# Patient Record
Sex: FEMALE | Race: OTHER | NOT HISPANIC OR LATINO | URBAN - METROPOLITAN AREA
[De-identification: names, ages, dates, MRNs, and addresses within clinical notes are randomized per-mention and may not be internally consistent; named-entity substitution may affect disease eponyms.]

---

## 2017-11-07 ENCOUNTER — EMERGENCY (EMERGENCY)
Facility: HOSPITAL | Age: 27
LOS: 0 days | Discharge: HOME | End: 2017-11-07

## 2017-11-07 DIAGNOSIS — R07.81 PLEURODYNIA: ICD-10-CM

## 2017-11-07 DIAGNOSIS — J45.909 UNSPECIFIED ASTHMA, UNCOMPLICATED: ICD-10-CM

## 2017-11-07 DIAGNOSIS — M79.1 MYALGIA: ICD-10-CM

## 2017-11-07 DIAGNOSIS — F17.200 NICOTINE DEPENDENCE, UNSPECIFIED, UNCOMPLICATED: ICD-10-CM

## 2018-04-10 ENCOUNTER — EMERGENCY (EMERGENCY)
Facility: HOSPITAL | Age: 28
LOS: 0 days | Discharge: HOME | End: 2018-04-10

## 2018-04-10 VITALS
DIASTOLIC BLOOD PRESSURE: 61 MMHG | TEMPERATURE: 98 F | OXYGEN SATURATION: 100 % | HEART RATE: 76 BPM | SYSTOLIC BLOOD PRESSURE: 111 MMHG | RESPIRATION RATE: 18 BRPM
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
## 2018-04-10 DIAGNOSIS — M54.6 PAIN IN THORACIC SPINE: ICD-10-CM
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
## 2018-04-10 RX ORDER — IBUPROFEN 200 MG
600 TABLET ORAL ONCE
Qty: 0 | Refills: 0 | Status: COMPLETED | OUTPATIENT
Start: 2018-04-10 | End: 2018-04-10

## 2018-04-10 RX ORDER — METHOCARBAMOL 500 MG/1
1000 TABLET, FILM COATED ORAL ONCE
Qty: 0 | Refills: 0 | Status: COMPLETED | OUTPATIENT
Start: 2018-04-10 | End: 2018-04-10

## 2018-04-10 RX ADMIN — METHOCARBAMOL 1000 MILLIGRAM(S): 500 TABLET, FILM COATED ORAL at 13:31

## 2018-04-10 RX ADMIN — Medication 600 MILLIGRAM(S): at 13:31

## 2018-04-10 NOTE — ED PROVIDER NOTE - NS ED ROS FT
Review of Systems    Constitutional: (-) fever (-) night sweats (-) chills  ENT: (-) sore throat (-) ear ache (-) nasal discharge  Cardiovascular: (-) chest pain  (-) palpitations  Respiratory: (-) SOB (-) cough   GI: (-) abdominal pain (-) N/V (-) diarrhea  Integumentary: (-) rash (-) redness (-) bruising  Neurological:  (-) focal deficit (-) altered mental status

## 2018-04-10 NOTE — ED PROVIDER NOTE - MEDICAL DECISION MAKING DETAILS
Pt. has no midline TTP full ROM able to ambulate w/o assistance with pain relieved with Ibuprofen. Patient to be discharged from ED. Any available test results were discussed with patient and/or family. Verbal instructions given, including instructions to return to ED immediately for any new, worsening, or concerning symptoms. Patient endorsed understanding. Written discharge instructions additionally given, including follow-up plan.

## 2018-04-10 NOTE — ED PROVIDER NOTE - PHYSICAL EXAMINATION
Physical Exam    Vital Signs: I have reviewed the initial vital signs.  Constitutional: well-nourished, appears stated age, no acute distress  Eyes: PERRLA, EOM intact, RAPD absent, conjunctiva clear and symmetrical lids.  ENT: neck supple with no adenopathy, moist MM.  Cardiovascular: +S1/S2, no murmurs, regular rate, regular rhythm, well-perfused extremities  Respiratory: unlabored respiratory effort, clear to auscultation bilaterally, speaks in full sentences  Gastrointestinal: soft, non-tender abdomen, no pulsatile mass  Msk: (+) perispinal thoracic TTP. No midline C-Spine TTP with full ROM in the neck, no lumbar spine TTP. Full ROM at all joints with no pain or deficit. Able to ambulate well with no deficit.   Neurologic: awake, alert, extremities’ motor and sensory functions grossly intact.  Integumentary: No bruising or discoloration noted.

## 2018-05-17 ENCOUNTER — OUTPATIENT (OUTPATIENT)
Dept: OUTPATIENT SERVICES | Facility: HOSPITAL | Age: 28
LOS: 1 days | Discharge: HOME | End: 2018-05-17

## 2020-11-11 ENCOUNTER — EMERGENCY (EMERGENCY)
Facility: HOSPITAL | Age: 30
LOS: 0 days | Discharge: HOME | End: 2020-11-11
Attending: EMERGENCY MEDICINE | Admitting: EMERGENCY MEDICINE
Payer: MEDICAID

## 2020-11-11 VITALS
WEIGHT: 220.02 LBS | RESPIRATION RATE: 18 BRPM | TEMPERATURE: 99 F | OXYGEN SATURATION: 100 % | SYSTOLIC BLOOD PRESSURE: 113 MMHG | DIASTOLIC BLOOD PRESSURE: 71 MMHG | HEART RATE: 87 BPM
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
## 2020-11-11 DIAGNOSIS — S80.01XA CONTUSION OF RIGHT KNEE, INITIAL ENCOUNTER: ICD-10-CM

## 2020-11-11 DIAGNOSIS — S39.012A STRAIN OF MUSCLE, FASCIA AND TENDON OF LOWER BACK, INITIAL ENCOUNTER: ICD-10-CM
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
## 2020-11-11 PROCEDURE — 99284 EMERGENCY DEPT VISIT MOD MDM: CPT

## 2020-11-11 PROCEDURE — 73564 X-RAY EXAM KNEE 4 OR MORE: CPT | Mod: 26,RT

## 2020-11-11 PROCEDURE — 71046 X-RAY EXAM CHEST 2 VIEWS: CPT | Mod: 26

## 2020-11-11 RX ORDER — IBUPROFEN 200 MG
800 TABLET ORAL ONCE
Refills: 0 | Status: COMPLETED | OUTPATIENT
Start: 2020-11-11 | End: 2020-11-11

## 2020-11-11 RX ORDER — IBUPROFEN 200 MG
1 TABLET ORAL
Qty: 42 | Refills: 0
Start: 2020-11-11 | End: 2020-11-24

## 2020-11-11 RX ORDER — METHOCARBAMOL 500 MG/1
1000 TABLET, FILM COATED ORAL ONCE
Refills: 0 | Status: COMPLETED | OUTPATIENT
Start: 2020-11-11 | End: 2020-11-11

## 2020-11-11 RX ORDER — METHOCARBAMOL 500 MG/1
1 TABLET, FILM COATED ORAL
Qty: 21 | Refills: 0
Start: 2020-11-11 | End: 2020-11-17

## 2020-11-11 RX ADMIN — Medication 800 MILLIGRAM(S): at 20:09

## 2020-11-11 RX ADMIN — METHOCARBAMOL 1000 MILLIGRAM(S): 500 TABLET, FILM COATED ORAL at 20:09

## 2020-11-11 NOTE — ED ADULT TRIAGE NOTE - CHIEF COMPLAINT QUOTE
right knee and shin pain with abrasions after trip and fall yesterday, also c/o mid-back and neck pain, denies head injury/LOC

## 2020-11-11 NOTE — ED PROVIDER NOTE - NSFOLLOWUPCLINICS_GEN_ALL_ED_FT
[facility]  Orthpedic  [address], [place]   Phone: [phone]  Fax:   Follow Up Time: 1-3 Days    [facility] ([place] [facility])  Rehabilitation  [facility] [address] [place] [address]  Phone: [phone]  Fax:   Follow Up Time: 1-3 Days

## 2020-11-11 NOTE — ED PROVIDER NOTE - NSFOLLOWUPINSTRUCTIONS_ED_ALL_ED_FT
Low Back Sprain    A sprain is a stretch or tear in the bands of tissue that hold bones and joints together (ligaments). Sprains of the lower back (lumbar spine) are a common cause of low back pain. A sprain occurs when ligaments are overextended or stretched beyond their limits. The ligaments can become inflamed, resulting in pain and sudden muscle tightening (spasms). A sprain can be caused by an injury (trauma), or it can develop gradually due to overuse.  There are three types of sprains:  Grade 1 is a mild sprain involving an overstretched ligament or a very slight tear of the ligament.Grade 2 is a moderate sprain involving a partial tear of the ligament.Grade 3 is a severe sprain involving a complete tear of the ligament.What are the causes?  This condition may be caused by:  Trauma, such as a fall or a hit to the body.Twisting or overstretching the back. This may result from doing activities that require a lot of energy, such as lifting heavy objects.What increases the risk?  The following factors may increase your risk of getting this condition:  Playing contact sports.Participating in sports or activities that put excessive stress on the back and require a lot of bending and twisting, including:  Lifting weights or heavy objects.Gymnastics.Soccer.Figure skating.Snowboarding.Being overweight or obese.Having poor strength and flexibility.What are the signs or symptoms?  Symptoms of this condition may include:  Sharp or dull pain in the lower back that does not go away. Pain may extend to the buttocks.Stiffness.Limited range of motion.Inability to stand up straight due to stiffness or pain.Muscle spasms.How is this diagnosed?  ImageThis condition may be diagnosed based on:  Your symptoms.Your medical history.A physical exam.  Your health care provider may push on certain areas of your back to determine the source of your pain.You may be asked to bend forward, backward, and side to side to assess the severity of your pain and your range of motion.Imaging tests, such as:  X-rays.MRI.How is this treated?  Treatment for this condition may include:  Applying heat and cold to the affected area.Medicines to help relieve pain and to relax your muscles (muscle relaxants).NSAIDs to help reduce swelling and discomfort.Physical therapy.When your symptoms improve, it is important to gradually return to your normal routine as soon as possible to reduce pain, avoid stiffness, and avoid loss of muscle strength. Generally, symptoms should improve within 6 weeks of treatment. However, recovery time varies.  Follow these instructions at home:  Managing pain, stiffness, and swelling     Image   If directed, apply ice to the injured area during the first 24 hours after your injury.  Put ice in a plastic bag.Place a towel between your skin and the bag.Leave the ice on for 20 minutes, 2–3 times a day.If directed, apply heat to the affected area as often as told by your health care provider. Use the heat source that your health care provider recommends, such as a moist heat pack or a heating pad.  Place a towel between your skin and the heat source.Leave the heat on for 20–30 minutes.Remove the heat if your skin turns bright red. This is especially important if you are unable to feel pain, heat, or cold. You may have a greater risk of getting burned.Activity     Rest and return to your normal activities as told by your health care provider. Ask your health care provider what activities are safe for you.Avoid activities that take a lot of effort (are strenuous) for as long as told by your health care provider.Do exercises as told by your health care provider.General instructions       Take over-the-counter and prescription medicines only as told by your health care provider.If you have questions or concerns about safety while taking pain medicine, talk with your health care provider.Do not drive or operate heavy machinery until you know how your pain medicine affects you.Do not use any tobacco products, such as cigarettes, chewing tobacco, and e-cigarettes. Tobacco can delay bone healing. If you need help quitting, ask your health care provider.Keep all follow-up visits as told by your health care provider. This is important.How is this prevented?  Warm up and stretch before being active.Cool down and stretch after being active.Give your body time to rest between periods of activity.Avoid:  Being physically inactive for long periods at a time.Exercising or playing sports when you are tired or in pain.Use correct form when playing sports and lifting heavy objects.Use good posture when sitting and standing.Maintain a healthy weight.Sleep on a mattress with medium firmness to support your back.Make sure to use equipment that fits you, including shoes that fit well.Be safe and responsible while being active to avoid falls.Do at least 150 minutes of moderate-intensity exercise each week, such as brisk walking or water aerobics. Try a form of exercise that takes stress off your back, such as swimming or stationary cycling.Maintain physical fitness, including:  Strength. In particular, develop and maintain strong abdominal muscles.Flexibility.Cardiovascular fitness.Endurance.Contact a health care provider if:  Your back pain does not improve after 6 weeks of treatment.Your symptoms get worse.Get help right away if:  Your back pain is severe.You are unable to stand or walk.You develop pain in your legs.You develop weakness in your buttocks or legs.You have difficulty controlling when you urinate or when you have a bowel movement.This information is not intended to replace advice given to you by your health care provider. Make sure you discuss any questions you have with your health care provider.    Document Released: 12/18/2006 Document Revised: 08/24/2017 Document Reviewed: 09/28/2016  [employer] Interactive Patient Education © 2019 [employer]

## 2020-11-11 NOTE — ED PROVIDER NOTE - ATTENDING CONTRIBUTION TO CARE
I personally evaluated the patient. I reviewed the Resident’s or Physician Assistant’s note (as assigned above), and agree with the findings and plan except as documented in my note.  30F with no pmhx presents with mid back pain and right knee pain and abrasion after tripping and falling yesterday. Denies any head trauma or LOC. VS reviewed, pt non-toxic appearing, NAD. Head ncat,  neck supple, normal ROM, normal s1s2 without any murmurs, Lungs CTAB with normal work of breathing. abck with midline ttp at around T6, no appreciable deformities, abd +BS, s/nd/nt, extremities with normal ROM of the right knee, pt is ambulatory, neuro exam grossly normal. + abrasion to the right knee. Plan is xray images, meds as needed and reassess.

## 2020-11-11 NOTE — ED PROVIDER NOTE - CARE PLAN
Principal Discharge DX:	Lumbar strain, initial encounter  Secondary Diagnosis:	Contusion of right knee, sequela

## 2020-11-11 NOTE — ED PROVIDER NOTE - MUSCULOSKELETAL, MLM
Spine appears normal, range of motion is not limited, no midline tenderness, (+) bilateral lower lumbar paraspinal tenderness with spasm.  Right knee:  FROM, (+) superficial abrasion and tenderness anteriorly, no laxity, no motor or sensory deficit, pedal pulses 2+

## 2020-11-11 NOTE — ED PROVIDER NOTE - CLINICAL SUMMARY MEDICAL DECISION MAKING FREE TEXT BOX
Pt presents with back pain and right knee pain after a fall yesterday. Required pain control and imaging. No acute injuries. Will d/c with outpt f/up.

## 2020-11-11 NOTE — ED ADULT NURSE NOTE - NS ED NURSE DC INFO COMPLEXITY
Patient asked questions/Returned Demonstration/Straightforward: Basic instructions, no meds, no home treatment/Verbalized Understanding/Simple: Patient demonstrates quick and easy understanding

## 2020-11-11 NOTE — ED PROVIDER NOTE - OBJECTIVE STATEMENT
30 y.o. female with a PMH of asthma and SLE presented to the ER c/o fall yesterday in gas station.  PT states that she fell directly onto Right knee on out-stretched hands.  (+) Right knee pain.  (+) low back pain.  Denies head trauma, LOC, abdominal pain, flank pain, chest pain, extremity weakness/paresthesias, bladder/bowel incontinence, saddle numbness, dysuria, hematuria, ataxia. Tetanus up to date.  No other injuries or complaints.

## 2020-11-11 NOTE — ED PROVIDER NOTE - PATIENT PORTAL LINK FT
You can access the FollowMyHealth Patient Portal offered by [facility] by registering at the following website: [email or web address]. By joining [employer]’s FollowMyHealth portal, you will also be able to view your health information using other applications (apps) compatible with our system.

## 2022-04-05 NOTE — ED PROVIDER NOTE - RESPIRATORY NEGATIVE STATEMENT, MLM
Suggested alternatives:    Azelastine HCL 0.05% drops  Olopatadine HCL 0.2 % eye drops  Cromolyn 4% eye drops   no chest pain, no cough, and no shortness of breath.

## 2022-06-20 NOTE — ED PROVIDER NOTE - NS ED MD DISPO DISCHARGE
Home Calcipotriene Pregnancy And Lactation Text: This medication has not been proven safe during pregnancy. It is unknown if this medication is excreted in breast milk.

## 2023-07-03 ENCOUNTER — EMERGENCY (EMERGENCY)
Facility: HOSPITAL | Age: 33
LOS: 0 days | Discharge: HOME | End: 2023-07-03
Attending: EMERGENCY MEDICINE
Payer: MEDICAID

## 2023-07-03 VITALS
HEART RATE: 80 BPM | WEIGHT: 262.35 LBS | TEMPERATURE: 98 F | SYSTOLIC BLOOD PRESSURE: 115 MMHG | OXYGEN SATURATION: 100 % | DIASTOLIC BLOOD PRESSURE: 70 MMHG | RESPIRATION RATE: 18 BRPM

## 2023-07-03 VITALS
DIASTOLIC BLOOD PRESSURE: 64 MMHG | OXYGEN SATURATION: 100 % | HEART RATE: 81 BPM | SYSTOLIC BLOOD PRESSURE: 102 MMHG | RESPIRATION RATE: 17 BRPM | TEMPERATURE: 98 F

## 2023-07-03 DIAGNOSIS — R55 SYNCOPE AND COLLAPSE: ICD-10-CM

## 2023-07-03 DIAGNOSIS — R10.9 UNSPECIFIED ABDOMINAL PAIN: ICD-10-CM

## 2023-07-03 DIAGNOSIS — N83.291 OTHER OVARIAN CYST, RIGHT SIDE: ICD-10-CM

## 2023-07-03 DIAGNOSIS — Z86.2 PERSONAL HISTORY OF DISEASES OF THE BLOOD AND BLOOD-FORMING ORGANS AND CERTAIN DISORDERS INVOLVING THE IMMUNE MECHANISM: ICD-10-CM

## 2023-07-03 LAB
ALBUMIN SERPL ELPH-MCNC: 4.5 G/DL — SIGNIFICANT CHANGE UP (ref 3.5–5.2)
ALP SERPL-CCNC: 65 U/L — SIGNIFICANT CHANGE UP (ref 30–115)
ALT FLD-CCNC: 13 U/L — SIGNIFICANT CHANGE UP (ref 0–41)
ANION GAP SERPL CALC-SCNC: 11 MMOL/L — SIGNIFICANT CHANGE UP (ref 7–14)
APPEARANCE UR: CLEAR — SIGNIFICANT CHANGE UP
AST SERPL-CCNC: 17 U/L — SIGNIFICANT CHANGE UP (ref 0–41)
BACTERIA # UR AUTO: NEGATIVE — SIGNIFICANT CHANGE UP
BASOPHILS # BLD AUTO: 0.05 K/UL — SIGNIFICANT CHANGE UP (ref 0–0.2)
BASOPHILS NFR BLD AUTO: 0.4 % — SIGNIFICANT CHANGE UP (ref 0–1)
BILIRUB SERPL-MCNC: 0.2 MG/DL — SIGNIFICANT CHANGE UP (ref 0.2–1.2)
BILIRUB UR-MCNC: NEGATIVE — SIGNIFICANT CHANGE UP
BUN SERPL-MCNC: 13 MG/DL — SIGNIFICANT CHANGE UP (ref 10–20)
CALCIUM SERPL-MCNC: 10.5 MG/DL — SIGNIFICANT CHANGE UP (ref 8.4–10.5)
CHLORIDE SERPL-SCNC: 101 MMOL/L — SIGNIFICANT CHANGE UP (ref 98–110)
CO2 SERPL-SCNC: 24 MMOL/L — SIGNIFICANT CHANGE UP (ref 17–32)
COLOR SPEC: SIGNIFICANT CHANGE UP
CREAT SERPL-MCNC: 0.8 MG/DL — SIGNIFICANT CHANGE UP (ref 0.7–1.5)
DIFF PNL FLD: NEGATIVE — SIGNIFICANT CHANGE UP
EGFR: 102 ML/MIN/1.73M2 — SIGNIFICANT CHANGE UP
EOSINOPHIL # BLD AUTO: 0.08 K/UL — SIGNIFICANT CHANGE UP (ref 0–0.7)
EOSINOPHIL NFR BLD AUTO: 0.7 % — SIGNIFICANT CHANGE UP (ref 0–8)
EPI CELLS # UR: 6 /HPF — HIGH (ref 0–5)
GLUCOSE SERPL-MCNC: 94 MG/DL — SIGNIFICANT CHANGE UP (ref 70–99)
GLUCOSE UR QL: NEGATIVE — SIGNIFICANT CHANGE UP
HCG SERPL QL: NEGATIVE — SIGNIFICANT CHANGE UP
HCT VFR BLD CALC: 34.3 % — LOW (ref 37–47)
HGB BLD-MCNC: 10.7 G/DL — LOW (ref 12–16)
HYALINE CASTS # UR AUTO: 2 /LPF — SIGNIFICANT CHANGE UP (ref 0–7)
IMM GRANULOCYTES NFR BLD AUTO: 0.4 % — HIGH (ref 0.1–0.3)
KETONES UR-MCNC: NEGATIVE — SIGNIFICANT CHANGE UP
LACTATE SERPL-SCNC: 1.4 MMOL/L — SIGNIFICANT CHANGE UP (ref 0.7–2)
LEUKOCYTE ESTERASE UR-ACNC: ABNORMAL
LIDOCAIN IGE QN: 19 U/L — SIGNIFICANT CHANGE UP (ref 7–60)
LYMPHOCYTES # BLD AUTO: 1.49 K/UL — SIGNIFICANT CHANGE UP (ref 1.2–3.4)
LYMPHOCYTES # BLD AUTO: 12.4 % — LOW (ref 20.5–51.1)
MCHC RBC-ENTMCNC: 25.1 PG — LOW (ref 27–31)
MCHC RBC-ENTMCNC: 31.2 G/DL — LOW (ref 32–37)
MCV RBC AUTO: 80.5 FL — LOW (ref 81–99)
MONOCYTES # BLD AUTO: 0.79 K/UL — HIGH (ref 0.1–0.6)
MONOCYTES NFR BLD AUTO: 6.6 % — SIGNIFICANT CHANGE UP (ref 1.7–9.3)
NEUTROPHILS # BLD AUTO: 9.59 K/UL — HIGH (ref 1.4–6.5)
NEUTROPHILS NFR BLD AUTO: 79.5 % — HIGH (ref 42.2–75.2)
NITRITE UR-MCNC: NEGATIVE — SIGNIFICANT CHANGE UP
NRBC # BLD: 0 /100 WBCS — SIGNIFICANT CHANGE UP (ref 0–0)
PH UR: 6 — SIGNIFICANT CHANGE UP (ref 5–8)
PLATELET # BLD AUTO: 321 K/UL — SIGNIFICANT CHANGE UP (ref 130–400)
PMV BLD: 10 FL — SIGNIFICANT CHANGE UP (ref 7.4–10.4)
POTASSIUM SERPL-MCNC: 4.6 MMOL/L — SIGNIFICANT CHANGE UP (ref 3.5–5)
POTASSIUM SERPL-SCNC: 4.6 MMOL/L — SIGNIFICANT CHANGE UP (ref 3.5–5)
PROT SERPL-MCNC: 7.7 G/DL — SIGNIFICANT CHANGE UP (ref 6–8)
PROT UR-MCNC: SIGNIFICANT CHANGE UP
RBC # BLD: 4.26 M/UL — SIGNIFICANT CHANGE UP (ref 4.2–5.4)
RBC # FLD: 16.2 % — HIGH (ref 11.5–14.5)
RBC CASTS # UR COMP ASSIST: 1 /HPF — SIGNIFICANT CHANGE UP (ref 0–4)
SODIUM SERPL-SCNC: 136 MMOL/L — SIGNIFICANT CHANGE UP (ref 135–146)
SP GR SPEC: 1.02 — SIGNIFICANT CHANGE UP (ref 1.01–1.03)
TROPONIN T SERPL-MCNC: <0.01 NG/ML — SIGNIFICANT CHANGE UP
UROBILINOGEN FLD QL: SIGNIFICANT CHANGE UP
WBC # BLD: 12.05 K/UL — HIGH (ref 4.8–10.8)
WBC # FLD AUTO: 12.05 K/UL — HIGH (ref 4.8–10.8)
WBC UR QL: 17 /HPF — HIGH (ref 0–5)

## 2023-07-03 PROCEDURE — 87086 URINE CULTURE/COLONY COUNT: CPT

## 2023-07-03 PROCEDURE — 76830 TRANSVAGINAL US NON-OB: CPT

## 2023-07-03 PROCEDURE — 99285 EMERGENCY DEPT VISIT HI MDM: CPT | Mod: 25

## 2023-07-03 PROCEDURE — 76830 TRANSVAGINAL US NON-OB: CPT | Mod: 26

## 2023-07-03 PROCEDURE — 84484 ASSAY OF TROPONIN QUANT: CPT

## 2023-07-03 PROCEDURE — 81001 URINALYSIS AUTO W/SCOPE: CPT

## 2023-07-03 PROCEDURE — 93005 ELECTROCARDIOGRAM TRACING: CPT

## 2023-07-03 PROCEDURE — 74177 CT ABD & PELVIS W/CONTRAST: CPT | Mod: MA

## 2023-07-03 PROCEDURE — 71045 X-RAY EXAM CHEST 1 VIEW: CPT | Mod: 26

## 2023-07-03 PROCEDURE — 84703 CHORIONIC GONADOTROPIN ASSAY: CPT

## 2023-07-03 PROCEDURE — 96374 THER/PROPH/DIAG INJ IV PUSH: CPT | Mod: XU

## 2023-07-03 PROCEDURE — 36415 COLL VENOUS BLD VENIPUNCTURE: CPT

## 2023-07-03 PROCEDURE — 83690 ASSAY OF LIPASE: CPT

## 2023-07-03 PROCEDURE — 71045 X-RAY EXAM CHEST 1 VIEW: CPT

## 2023-07-03 PROCEDURE — 93010 ELECTROCARDIOGRAM REPORT: CPT

## 2023-07-03 PROCEDURE — 99285 EMERGENCY DEPT VISIT HI MDM: CPT

## 2023-07-03 PROCEDURE — 83605 ASSAY OF LACTIC ACID: CPT

## 2023-07-03 PROCEDURE — 87186 SC STD MICRODIL/AGAR DIL: CPT

## 2023-07-03 PROCEDURE — 80053 COMPREHEN METABOLIC PANEL: CPT

## 2023-07-03 PROCEDURE — 85025 COMPLETE CBC W/AUTO DIFF WBC: CPT

## 2023-07-03 PROCEDURE — 74177 CT ABD & PELVIS W/CONTRAST: CPT | Mod: 26,MA

## 2023-07-03 PROCEDURE — 87077 CULTURE AEROBIC IDENTIFY: CPT

## 2023-07-03 RX ORDER — SODIUM CHLORIDE 9 MG/ML
1000 INJECTION, SOLUTION INTRAVENOUS ONCE
Refills: 0 | Status: COMPLETED | OUTPATIENT
Start: 2023-07-03 | End: 2023-07-03

## 2023-07-03 RX ORDER — ONDANSETRON 8 MG/1
4 TABLET, FILM COATED ORAL ONCE
Refills: 0 | Status: COMPLETED | OUTPATIENT
Start: 2023-07-03 | End: 2023-07-03

## 2023-07-03 RX ADMIN — SODIUM CHLORIDE 1000 MILLILITER(S): 9 INJECTION, SOLUTION INTRAVENOUS at 10:40

## 2023-07-03 RX ADMIN — ONDANSETRON 4 MILLIGRAM(S): 8 TABLET, FILM COATED ORAL at 11:22

## 2023-07-03 NOTE — ED PROVIDER NOTE - PHYSICAL EXAMINATION
CONSTITUTIONAL: Well-developed; well-nourished; in no acute distress.   SKIN: Warm, dry  HEAD: Normocephalic; atraumatic  EYES: EOMI, normal sclera and conjunctiva   ENT: No nasal discharge; airway clear. MMM  CARD:  Regular rate and rhythm. Normal S1, S2  RESP: No increased WOB. CTA b/l without wheezes, crackles, rhonchi  ABD: Normoactive BS. Soft, nondistended. Suprapubic tenderness. No CVA tenderness.   EXT: Normal ROM.   NEURO: Alert, oriented, grossly unremarkable  PSYCH: Cooperative, appropriate.

## 2023-07-03 NOTE — ED ADULT NURSE NOTE - NSFALLRISKINTERV_ED_ALL_ED
Assistance OOB with selected safe patient handling equipment if applicable/Assistance with ambulation/Communicate fall risk and risk factors to all staff, patient, and family/Encourage patient to sit up slowly, dangle for a short time, stand at bedside before walking/Monitor gait and stability/Orthostatic vital signs/Provide visual cue: yellow wristband, yellow gown, etc/Reinforce activity limits and safety measures with patient and family/Toileting schedule using arm’s reach rule for commode and bathroom/Use of alarms - bed, stretcher, chair and/or video monitoring/Bed in lowest position, wheels locked, appropriate side rails in place/Call bell, personal items and telephone in reach/Instruct patient to call for assistance before getting out of bed/chair/stretcher/Non-slip footwear applied when patient is off stretcher/[place] to call system/Physically safe environment - no spills, clutter or unnecessary equipment/Purposeful Proactive Rounding/Room/bathroom lighting operational, light cord in reach

## 2023-07-03 NOTE — ED PROVIDER NOTE - OBJECTIVE STATEMENT
33 y/o F with PMH anemia presenting for abdominal pain that began today. Pt reports she was trying to have bowel movement just PTA when she had a syncopal episode on the toilet, woke up with abdominal pain. Denies recent fever, N/V, diarrhea, constipation, dysuria, hematuria

## 2023-07-03 NOTE — ED PROVIDER NOTE - CLINICAL SUMMARY MEDICAL DECISION MAKING FREE TEXT BOX
32-year-old female presented to the ED for abdominal pain.  Patient reports was in the bathroom and with an onset of pain patient had a syncopal episode.  Physical exam with mild suprapubic tenderness.  We obtained labs along with ultrasound CT and chest x-ray.  ED Chest X-Ray independent interpretation by me, which did not reveal a pneumothorax, infiltrate, or effusion.  Labs noted to have anemia with low MCV.  Patient does report she has a history of iron deficiency anemia and is currently not taking iron pills.  UA is noted to have leuk esterase was patient denies any dysuria.  CT abdomen pelvis and ultrasound revealed hemorrhagic cyst.  Currently no acute intervention.  Results conveyed to patient.  We will follow-up with outpatient OB/GYN.  Discharged home.

## 2023-07-03 NOTE — ED ADULT TRIAGE NOTE - CHIEF COMPLAINT QUOTE
C/o abd pain radiating to low back with nausea. LMP 2 weeks ago, also states syncopal episode while on toilet.

## 2023-07-03 NOTE — ED PROVIDER NOTE - PATIENT PORTAL LINK FT
You can access the FollowMyHealth Patient Portal offered by [facility] by registering at the following website: [email or web address]. By joining [employer]’s FollowMyHealth portal, you will also be able to view your health information using other applications (apps) compatible with our system.

## 2023-07-03 NOTE — ED ADULT NURSE NOTE - OBJECTIVE STATEMENT
Pt c/o lower abdominal pain since 3 am this morning, pt c/o headache , nausea, syncope. Pt reported 3 syncopal episodes this morning while sitting in the bathroom. Pt denies fall/HT, fever.  Pt states her LMP was 2 weeks ago. Pt c/o urinary frequency.
